# Patient Record
Sex: MALE | Race: WHITE | Employment: FULL TIME
[De-identification: names, ages, dates, MRNs, and addresses within clinical notes are randomized per-mention and may not be internally consistent; named-entity substitution may affect disease eponyms.]

---

## 2017-09-15 ENCOUNTER — SURGERY (OUTPATIENT)
Age: 42
End: 2017-09-15

## 2017-09-15 ENCOUNTER — HOSPITAL ENCOUNTER (OUTPATIENT)
Facility: HOSPITAL | Age: 42
Setting detail: HOSPITAL OUTPATIENT SURGERY
Discharge: HOME | End: 2017-09-15
Attending: INTERNAL MEDICINE | Admitting: INTERNAL MEDICINE
Payer: COMMERCIAL

## 2017-09-15 VITALS
RESPIRATION RATE: 18 BRPM | WEIGHT: 160 LBS | DIASTOLIC BLOOD PRESSURE: 79 MMHG | BODY MASS INDEX: 24.25 KG/M2 | HEART RATE: 58 BPM | HEIGHT: 68 IN | OXYGEN SATURATION: 98 % | SYSTOLIC BLOOD PRESSURE: 115 MMHG

## 2017-09-15 DIAGNOSIS — R10.12 LEFT UPPER QUADRANT PAIN: ICD-10-CM

## 2017-09-15 PROCEDURE — 0DB48ZX EXCISION OF ESOPHAGOGASTRIC JUNCTION, VIA NATURAL OR ARTIFICIAL OPENING ENDOSCOPIC, DIAGNOSTIC: ICD-10-PCS | Performed by: INTERNAL MEDICINE

## 2017-09-15 PROCEDURE — 88312 SPECIAL STAINS GROUP 1: CPT | Performed by: INTERNAL MEDICINE

## 2017-09-15 PROCEDURE — 0DB68ZX EXCISION OF STOMACH, VIA NATURAL OR ARTIFICIAL OPENING ENDOSCOPIC, DIAGNOSTIC: ICD-10-PCS | Performed by: INTERNAL MEDICINE

## 2017-09-15 PROCEDURE — 99152 MOD SED SAME PHYS/QHP 5/>YRS: CPT

## 2017-09-15 PROCEDURE — 88305 TISSUE EXAM BY PATHOLOGIST: CPT | Performed by: INTERNAL MEDICINE

## 2017-09-15 RX ORDER — MIDAZOLAM HYDROCHLORIDE 1 MG/ML
INJECTION INTRAMUSCULAR; INTRAVENOUS
Status: DISCONTINUED | OUTPATIENT
Start: 2017-09-15 | End: 2017-09-15

## 2017-09-15 RX ORDER — SODIUM CHLORIDE, SODIUM LACTATE, POTASSIUM CHLORIDE, CALCIUM CHLORIDE 600; 310; 30; 20 MG/100ML; MG/100ML; MG/100ML; MG/100ML
INJECTION, SOLUTION INTRAVENOUS CONTINUOUS
Status: DISCONTINUED | OUTPATIENT
Start: 2017-09-15 | End: 2017-09-15

## 2017-09-15 RX ORDER — MIDAZOLAM HYDROCHLORIDE 1 MG/ML
1 INJECTION INTRAMUSCULAR; INTRAVENOUS EVERY 5 MIN PRN
Status: DISCONTINUED | OUTPATIENT
Start: 2017-09-15 | End: 2017-09-15

## 2017-09-15 RX ORDER — SODIUM CHLORIDE 0.9 % (FLUSH) 0.9 %
10 SYRINGE (ML) INJECTION AS NEEDED
Status: DISCONTINUED | OUTPATIENT
Start: 2017-09-15 | End: 2017-09-15

## 2017-09-15 NOTE — H&P
PRE-PROCEDURE UPDATE    HPI: Angeline Angel is a 40year old male. 5/20/1975. Patient presents for an Esophagogastroduodenoscopy.  Abdominal pain, seen 8/14/17, see office visit for details    ALLERGIES: No Known Allergies      No current outpatient pr m)   Wt 160 lb (72.6 kg)   SpO2 99%   BMI 24.33 kg/m²   LUNGS:   Clear to auscultation. CARDIAC:   RRR, normal S1, S2; no S3 or murmur appreciated. ABDOMEN:   Bowel sounds normoactive. Soft, no organomegaly or masses appreciated. Nontender.     IMPRESSION

## 2017-09-15 NOTE — PROCEDURES
ESOPHAGOGASTRODUODENOSCOPY REPORT    Patient Name:  Sarabjit Guzman Record #: [id number]  YOB: 1975  Date of Procedure: 9/15/2017    Referring physician: Corry Lfoton DO    Surgeon:  Adelina Condon MD    Pre-op diagnosis: Epig

## 2020-10-09 PROCEDURE — 88305 TISSUE EXAM BY PATHOLOGIST: CPT | Performed by: INTERNAL MEDICINE

## 2022-09-09 RX ORDER — HEPARIN SODIUM 5000 [USP'U]/ML
5000 INJECTION, SOLUTION INTRAVENOUS; SUBCUTANEOUS ONCE
Status: CANCELLED | OUTPATIENT
Start: 2022-09-09 | End: 2022-09-09

## 2022-09-09 RX ORDER — CHLORAL HYDRATE 500 MG
1000 CAPSULE ORAL DAILY
COMMUNITY

## 2022-09-09 RX ORDER — ASCORBIC ACID 500 MG
TABLET ORAL
COMMUNITY

## 2022-09-12 ENCOUNTER — LAB ENCOUNTER (OUTPATIENT)
Dept: LAB | Age: 47
End: 2022-09-12
Attending: SURGERY
Payer: COMMERCIAL

## 2022-09-12 DIAGNOSIS — Z01.812 ENCOUNTER FOR PREPROCEDURE SCREENING LABORATORY TESTING FOR COVID-19: ICD-10-CM

## 2022-09-12 DIAGNOSIS — Z20.822 ENCOUNTER FOR PREPROCEDURE SCREENING LABORATORY TESTING FOR COVID-19: ICD-10-CM

## 2022-09-13 LAB — SARS-COV-2 RNA RESP QL NAA+PROBE: NOT DETECTED

## 2022-09-14 ENCOUNTER — ANESTHESIA (OUTPATIENT)
Dept: SURGERY | Facility: HOSPITAL | Age: 47
End: 2022-09-14
Payer: COMMERCIAL

## 2022-09-14 ENCOUNTER — ANESTHESIA EVENT (OUTPATIENT)
Dept: SURGERY | Facility: HOSPITAL | Age: 47
End: 2022-09-14
Payer: COMMERCIAL

## 2022-09-14 ENCOUNTER — HOSPITAL ENCOUNTER (OUTPATIENT)
Facility: HOSPITAL | Age: 47
Setting detail: HOSPITAL OUTPATIENT SURGERY
Discharge: HOME | End: 2022-09-14
Attending: SURGERY | Admitting: SURGERY
Payer: COMMERCIAL

## 2022-09-14 VITALS
TEMPERATURE: 99 F | HEART RATE: 52 BPM | BODY MASS INDEX: 18.35 KG/M2 | DIASTOLIC BLOOD PRESSURE: 70 MMHG | RESPIRATION RATE: 16 BRPM | OXYGEN SATURATION: 100 % | WEIGHT: 123.88 LBS | SYSTOLIC BLOOD PRESSURE: 106 MMHG | HEIGHT: 69 IN

## 2022-09-14 DIAGNOSIS — Z20.822 ENCOUNTER FOR PREPROCEDURE SCREENING LABORATORY TESTING FOR COVID-19: Primary | ICD-10-CM

## 2022-09-14 DIAGNOSIS — Z01.812 ENCOUNTER FOR PREPROCEDURE SCREENING LABORATORY TESTING FOR COVID-19: Primary | ICD-10-CM

## 2022-09-14 PROCEDURE — 8E0W4CZ ROBOTIC ASSISTED PROCEDURE OF TRUNK REGION, PERCUTANEOUS ENDOSCOPIC APPROACH: ICD-10-PCS | Performed by: SURGERY

## 2022-09-14 PROCEDURE — 0YQ54ZZ REPAIR RIGHT INGUINAL REGION, PERCUTANEOUS ENDOSCOPIC APPROACH: ICD-10-PCS | Performed by: SURGERY

## 2022-09-14 DEVICE — LAPAROSCOPIC SELF-FIXATING MESH POLYESTER WITH POLYLACTIC ACID GRIPS AND COLLAGEN FILM
Type: IMPLANTABLE DEVICE | Site: INGUINAL | Status: FUNCTIONAL
Brand: PROGRIP

## 2022-09-14 RX ORDER — HYDROCODONE BITARTRATE AND ACETAMINOPHEN 10; 325 MG/1; MG/1
2 TABLET ORAL ONCE AS NEEDED
Status: DISCONTINUED | OUTPATIENT
Start: 2022-09-14 | End: 2022-09-14

## 2022-09-14 RX ORDER — PROCHLORPERAZINE EDISYLATE 5 MG/ML
5 INJECTION INTRAMUSCULAR; INTRAVENOUS EVERY 8 HOURS PRN
Status: DISCONTINUED | OUTPATIENT
Start: 2022-09-14 | End: 2022-09-14

## 2022-09-14 RX ORDER — ONDANSETRON 2 MG/ML
4 INJECTION INTRAMUSCULAR; INTRAVENOUS EVERY 6 HOURS PRN
Status: DISCONTINUED | OUTPATIENT
Start: 2022-09-14 | End: 2022-09-14

## 2022-09-14 RX ORDER — ONDANSETRON 2 MG/ML
INJECTION INTRAMUSCULAR; INTRAVENOUS AS NEEDED
Status: DISCONTINUED | OUTPATIENT
Start: 2022-09-14 | End: 2022-09-14 | Stop reason: SURG

## 2022-09-14 RX ORDER — BUPIVACAINE HYDROCHLORIDE 5 MG/ML
INJECTION, SOLUTION EPIDURAL; INTRACAUDAL AS NEEDED
Status: DISCONTINUED | OUTPATIENT
Start: 2022-09-14 | End: 2022-09-14 | Stop reason: HOSPADM

## 2022-09-14 RX ORDER — DEXAMETHASONE SODIUM PHOSPHATE 4 MG/ML
VIAL (ML) INJECTION AS NEEDED
Status: DISCONTINUED | OUTPATIENT
Start: 2022-09-14 | End: 2022-09-14 | Stop reason: SURG

## 2022-09-14 RX ORDER — HYDROCODONE BITARTRATE AND ACETAMINOPHEN 10; 325 MG/1; MG/1
1 TABLET ORAL ONCE AS NEEDED
Status: DISCONTINUED | OUTPATIENT
Start: 2022-09-14 | End: 2022-09-14

## 2022-09-14 RX ORDER — SODIUM CHLORIDE, SODIUM LACTATE, POTASSIUM CHLORIDE, CALCIUM CHLORIDE 600; 310; 30; 20 MG/100ML; MG/100ML; MG/100ML; MG/100ML
INJECTION, SOLUTION INTRAVENOUS CONTINUOUS
Status: DISCONTINUED | OUTPATIENT
Start: 2022-09-14 | End: 2022-09-14

## 2022-09-14 RX ORDER — HYDROMORPHONE HYDROCHLORIDE 1 MG/ML
0.6 INJECTION, SOLUTION INTRAMUSCULAR; INTRAVENOUS; SUBCUTANEOUS EVERY 5 MIN PRN
Status: DISCONTINUED | OUTPATIENT
Start: 2022-09-14 | End: 2022-09-14

## 2022-09-14 RX ORDER — LIDOCAINE HYDROCHLORIDE 10 MG/ML
INJECTION, SOLUTION EPIDURAL; INFILTRATION; INTRACAUDAL; PERINEURAL AS NEEDED
Status: DISCONTINUED | OUTPATIENT
Start: 2022-09-14 | End: 2022-09-14 | Stop reason: SURG

## 2022-09-14 RX ORDER — HYDROMORPHONE HYDROCHLORIDE 1 MG/ML
0.2 INJECTION, SOLUTION INTRAMUSCULAR; INTRAVENOUS; SUBCUTANEOUS EVERY 5 MIN PRN
Status: DISCONTINUED | OUTPATIENT
Start: 2022-09-14 | End: 2022-09-14

## 2022-09-14 RX ORDER — HYDROCODONE BITARTRATE AND ACETAMINOPHEN 5; 325 MG/1; MG/1
1 TABLET ORAL EVERY 4 HOURS PRN
Qty: 20 TABLET | Refills: 0 | Status: SHIPPED | OUTPATIENT
Start: 2022-09-14

## 2022-09-14 RX ORDER — ACETAMINOPHEN 500 MG
1000 TABLET ORAL ONCE AS NEEDED
Status: DISCONTINUED | OUTPATIENT
Start: 2022-09-14 | End: 2022-09-14

## 2022-09-14 RX ORDER — SCOLOPAMINE TRANSDERMAL SYSTEM 1 MG/1
PATCH, EXTENDED RELEASE TRANSDERMAL AS NEEDED
Status: DISCONTINUED | OUTPATIENT
Start: 2022-09-14 | End: 2022-09-14 | Stop reason: SURG

## 2022-09-14 RX ORDER — KETOROLAC TROMETHAMINE 30 MG/ML
INJECTION, SOLUTION INTRAMUSCULAR; INTRAVENOUS AS NEEDED
Status: DISCONTINUED | OUTPATIENT
Start: 2022-09-14 | End: 2022-09-14 | Stop reason: SURG

## 2022-09-14 RX ORDER — SCOLOPAMINE TRANSDERMAL SYSTEM 1 MG/1
1 PATCH, EXTENDED RELEASE TRANSDERMAL ONCE
Status: DISCONTINUED | OUTPATIENT
Start: 2022-09-14 | End: 2022-09-14 | Stop reason: HOSPADM

## 2022-09-14 RX ORDER — LIDOCAINE HYDROCHLORIDE AND EPINEPHRINE 10; 10 MG/ML; UG/ML
INJECTION, SOLUTION INFILTRATION; PERINEURAL AS NEEDED
Status: DISCONTINUED | OUTPATIENT
Start: 2022-09-14 | End: 2022-09-14 | Stop reason: HOSPADM

## 2022-09-14 RX ORDER — CEFAZOLIN SODIUM/WATER 2 G/20 ML
SYRINGE (ML) INTRAVENOUS
Status: DISCONTINUED
Start: 2022-09-14 | End: 2022-09-14

## 2022-09-14 RX ORDER — ACETAMINOPHEN 500 MG
1000 TABLET ORAL ONCE
Status: DISCONTINUED | OUTPATIENT
Start: 2022-09-14 | End: 2022-09-14 | Stop reason: HOSPADM

## 2022-09-14 RX ORDER — HYDROMORPHONE HYDROCHLORIDE 1 MG/ML
0.4 INJECTION, SOLUTION INTRAMUSCULAR; INTRAVENOUS; SUBCUTANEOUS EVERY 5 MIN PRN
Status: DISCONTINUED | OUTPATIENT
Start: 2022-09-14 | End: 2022-09-14

## 2022-09-14 RX ORDER — MIDAZOLAM HYDROCHLORIDE 1 MG/ML
INJECTION INTRAMUSCULAR; INTRAVENOUS AS NEEDED
Status: DISCONTINUED | OUTPATIENT
Start: 2022-09-14 | End: 2022-09-14 | Stop reason: SURG

## 2022-09-14 RX ORDER — NALOXONE HYDROCHLORIDE 0.4 MG/ML
80 INJECTION, SOLUTION INTRAMUSCULAR; INTRAVENOUS; SUBCUTANEOUS AS NEEDED
Status: DISCONTINUED | OUTPATIENT
Start: 2022-09-14 | End: 2022-09-14

## 2022-09-14 RX ORDER — ROCURONIUM BROMIDE 10 MG/ML
INJECTION, SOLUTION INTRAVENOUS AS NEEDED
Status: DISCONTINUED | OUTPATIENT
Start: 2022-09-14 | End: 2022-09-14 | Stop reason: SURG

## 2022-09-14 RX ORDER — CEFAZOLIN SODIUM/WATER 2 G/20 ML
2 SYRINGE (ML) INTRAVENOUS ONCE
Status: COMPLETED | OUTPATIENT
Start: 2022-09-14 | End: 2022-09-14

## 2022-09-14 RX ORDER — MEPERIDINE HYDROCHLORIDE 25 MG/ML
INJECTION INTRAMUSCULAR; INTRAVENOUS; SUBCUTANEOUS
Status: COMPLETED
Start: 2022-09-14 | End: 2022-09-14

## 2022-09-14 RX ORDER — MEPERIDINE HYDROCHLORIDE 25 MG/ML
12.5 INJECTION INTRAMUSCULAR; INTRAVENOUS; SUBCUTANEOUS ONCE
Status: COMPLETED | OUTPATIENT
Start: 2022-09-14 | End: 2022-09-14

## 2022-09-14 RX ADMIN — KETOROLAC TROMETHAMINE 30 MG: 30 INJECTION, SOLUTION INTRAMUSCULAR; INTRAVENOUS at 15:22:00

## 2022-09-14 RX ADMIN — CEFAZOLIN SODIUM/WATER 2 G: 2 G/20 ML SYRINGE (ML) INTRAVENOUS at 14:41:00

## 2022-09-14 RX ADMIN — DEXAMETHASONE SODIUM PHOSPHATE 4 MG: 4 MG/ML VIAL (ML) INJECTION at 14:30:00

## 2022-09-14 RX ADMIN — ROCURONIUM BROMIDE 20 MG: 10 INJECTION, SOLUTION INTRAVENOUS at 14:30:00

## 2022-09-14 RX ADMIN — SODIUM CHLORIDE, SODIUM LACTATE, POTASSIUM CHLORIDE, CALCIUM CHLORIDE: 600; 310; 30; 20 INJECTION, SOLUTION INTRAVENOUS at 15:38:00

## 2022-09-14 RX ADMIN — SCOLOPAMINE TRANSDERMAL SYSTEM 1 PATCH: 1 PATCH, EXTENDED RELEASE TRANSDERMAL at 14:19:00

## 2022-09-14 RX ADMIN — ROCURONIUM BROMIDE 15 MG: 10 INJECTION, SOLUTION INTRAVENOUS at 14:37:00

## 2022-09-14 RX ADMIN — MIDAZOLAM HYDROCHLORIDE 2 MG: 1 INJECTION INTRAMUSCULAR; INTRAVENOUS at 14:26:00

## 2022-09-14 RX ADMIN — ROCURONIUM BROMIDE 5 MG: 10 INJECTION, SOLUTION INTRAVENOUS at 15:10:00

## 2022-09-14 RX ADMIN — ONDANSETRON 4 MG: 2 INJECTION INTRAMUSCULAR; INTRAVENOUS at 15:22:00

## 2022-09-14 RX ADMIN — LIDOCAINE HYDROCHLORIDE 90 MG: 10 INJECTION, SOLUTION EPIDURAL; INFILTRATION; INTRACAUDAL; PERINEURAL at 14:29:00

## 2022-09-14 NOTE — BRIEF OP NOTE
Pre-Operative Diagnosis: RIGHT INGUINAL HERNIA     Post-Operative Diagnosis: RIGHT INGUINAL HERNIA      Procedure Performed:   ROBOTIC ASSISTED REPAIR OF RIGHT INGUINAL HERNIA WITH MESH    Surgeon(s) and Role:     * Maribell Rowland MD - Primary    Assistant(s):  PA: Luann Jaimes          Estimated Blood Loss: Blood Output: 5 mL (9/14/2022  3:20 PM)        Sandee House MD  9/14/2022  3:30 PM

## 2022-09-14 NOTE — ANESTHESIA PROCEDURE NOTES
Airway  Date/Time: 9/14/2022 2:39 PM  Urgency: elective    Airway not difficult    General Information and Staff    Patient location during procedure: OR  Anesthesiologist: Gavin Begum MD  Performed: anesthesiologist     Indications and Patient Condition  Indications for airway management: anesthesia  Sedation level: deep  Preoxygenated: yes  Patient position: sniffing  Mask difficulty assessment: 1 - vent by mask    Final Airway Details  Final airway type: endotracheal airway      Successful airway: ETT  Cuffed: yes   Successful intubation technique: Video laryngoscopy  Facilitating devices/methods: intubating stylet  Endotracheal tube insertion site: oral  Blade: GlideScope  Blade size: #3  ETT size (mm): 6.5    Cormack-Lehane Classification: grade I - full view of glottis  Placement verified by: chest auscultation and capnometry   Measured from: lips  ETT to lips (cm): 22  Number of attempts at approach: 2    Additional Comments  Initial attempt with Glidescope - grade 1 view but 7.0 OETT tight so removed endotracheal tube, mask ventilated again, and second attempt with Glidescope but used 6.5 OETT that passed through easily and atraumatically

## 2022-09-14 NOTE — H&P
[address] of this note is different from the original.  Rich Moeller is a 56year old  male    Patient presents for complaints of pain and a bulge in the right inguinal region. This has gradually gotten bigger    Past Medical History:   Diagnosis Date   Allergic rhinitis   Ankylosing spondylitis ([facility] 4/3/2012   Cervical spondylosis 5/21/2001   early   Chronic rhinitis 6/18/2002   Dizziness   GERD (gastroesophageal reflux disease) 6/19/2009   \"(+) GERD stay on Prilosec\"   HLA B27 (HLA B27 positive)   Immunocompromised patient ([facility] 1/5/2006   Neck arthritis 6/29/2000   Nonspecific reaction to cell mediated immunity measurement of gamma interferon antigen response without active tuberculosis 5/8/2012   Reaction to QuantiFERON-TB test   Positive TB test 5/24/2012   Vasomotor rhinitis     Past Surgical History:   Procedure Laterality Date   OTHER SURGICAL HISTORY   dental procedures   OTHER SURGICAL HISTORY   egd   VASECTOMY     Family History   Problem Relation Age of Onset   Hypertension Father   Other (Other) Father   HLA b27   Heart Disease Paternal Grandfather   CAD   Hypertension Maternal Grandmother   High Cholesterol Maternal Grandmother   Cancer Maternal Grandfather   bone ca     Social History  Tobacco Use  Smoking status: Never  Smokeless tobacco: Never  Alcohol use:  Yes  Alcohol/week: 0.0 standard drinks  Comment: occ  Drug use: No    ROS:  GENERAL HEALTH: otherwise feels well, no weight loss, no fever or chills  SKIN: denies any unusual skin rashes or jaundice  HEENT: denies nasal congestion, sinus pain or sore throat; hearing loss negative  RESPIRATORY: denies shortness of breath, wheezing or cough   CARDIOVASCULAR: denies chest pain or ALLEN; no palpitations   GI: denies nausea, vomiting, constipation, diarrhea; no rectal bleeding; no heartburn  GENITAL/: no dysuria, urgency or frequency, no tea colored urine  MUSCULOSKELETAL: no joint complaints upper or lower extremities  HEMATOLOGY: denies hx anemia; denies bruising or excessive bleeding    EXAM:  GENERAL: well developed, well nourished male, in no apparent distress  SKIN: anicteric  EYES: PERRLA, EOMI, sclera anicteric  HEENT: normocephalic; normal nose  NECK: supple, no JVD  RESPIRATORY: clear to percussion and auscultation  CARDIOVASCULAR: RRR, murmur negative  ABDOMEN: normal active BS, soft, nondistended; no HSM, no masses. There is a reducible hernia present in the right inguinal region. The testes are descended. LYMPHATIC: no lymphadenopathy  EXTREMITIES: no cyanosis, clubbing or edema    Imp: right inguinal hernia    Rec: Robotic assisted right inguinal hernia repair with mesh    The risks, benefits, and alternatives were discussed with the patient at length. We discussed the expectations after surgery including activity restrictions, weight limitations.  All of the patients questions were answered    The patient was given the booklet about hernia surgery

## 2022-09-15 NOTE — OPERATIVE REPORT
[facility]    PATIENT'S NAME: Chikis Joshua   ATTENDING PHYSICIAN: Chris Musa M.D. OPERATING PHYSICIAN: Chris Musa M.D. PATIENT ACCOUNT#:   [de-identified]    LOCATION:  [address] [place] [address]  MEDICAL RECORD #:   [id number]       YOB: 1975  ADMISSION DATE:       09/14/2022      OPERATION DATE:  09/14/2022    OPERATIVE REPORT    PREOPERATIVE DIAGNOSIS:  Right inguinal hernia. POSTOPERATIVE DIAGNOSIS:  Right inguinal hernia. PROCEDURE:  Robotic-assisted repair of right inguinal hernia with mesh. ASSISTANT:  Zeb Verdugo PA-C. Her assistance was necessary for prepping, draping, camera holding, trocar inserting, passing suture and mesh throughout the procedure. ANESTHESIA:  General.    OPERATIVE TECHNIQUE:  Patient was brought into the operating room, placed on the operating table in supine position. Inhalation anesthesia provided by the attending anesthesiologist.  The abdomen was prepped in usual sterile fashion. Then, 1% lidocaine and 0.5%  Marcaine was used as a local anesthetic. I made a skin incision in the midline just above the umbilicus. I inserted a Veress needle, established a pneumoperitoneum, placed an 8 mm trocar. I then inserted the camera. I placed an 8 mm trocar on the right. My assistant, she placed an 8 mm trocar in the left. Both were done under direct laparoscopic guidance. We docked the robot, inserted our instruments, and I went to the robotic console. First, I inspected the pelvic floor. Patient had a moderate-sized indirect right inguinal hernia. I divided the overlying peritoneum. I completely reduced the indirect hernia sac and exposed the entire inguinal floor. When this was complete, my assistant passed me the ProGrip mesh. I placed the ProGrip mesh across the inguinal floor centered over the hernia defect. I pressed the mesh into position. Hemostasis was good.   She then passed me the V-Loc suture and I reapproximated the peritoneum and she then retrieved the needle. At this time, the robotic portion of the procedure was complete. The trocars were removed. The pneumoperitoneum released. The wounds were closed with absorbable sutures. Steri-Strips, sterile dressing were provided. Patient tolerated the procedure well. He was taken to recovery room for observation.     Dictated By Mckayla Stringer M.D.  d: 09/14/2022 15:34:00  t: 09/14/2022 20:49:58  [facility] [id number]  CJT/

## 2023-11-07 ENCOUNTER — APPOINTMENT (OUTPATIENT)
Dept: URBAN - METROPOLITAN AREA CLINIC 242 | Age: 48
Setting detail: DERMATOLOGY
End: 2023-11-07

## 2023-11-07 DIAGNOSIS — L72.8 OTHER FOLLICULAR CYSTS OF THE SKIN AND SUBCUTANEOUS TISSUE: ICD-10-CM

## 2023-11-07 PROCEDURE — OTHER MONITORING: OTHER

## 2023-11-07 PROCEDURE — OTHER COUNSELING: OTHER

## 2023-11-07 PROCEDURE — 99203 OFFICE O/P NEW LOW 30 MIN: CPT

## 2023-11-07 PROCEDURE — OTHER DIAGNOSIS COMMENT: OTHER

## 2023-11-07 ASSESSMENT — LOCATION DETAILED DESCRIPTION DERM: LOCATION DETAILED: RIGHT POSTERIOR PARIETAL SCALP

## 2023-11-07 ASSESSMENT — LOCATION ZONE DERM: LOCATION ZONE: SCALP

## 2023-11-07 ASSESSMENT — LOCATION SIMPLE DESCRIPTION DERM: LOCATION SIMPLE: POSTERIOR SCALP

## 2024-12-17 ENCOUNTER — OFFICE VISIT (OUTPATIENT)
Dept: SURGERY | Facility: CLINIC | Age: 49
End: 2024-12-17

## 2024-12-17 DIAGNOSIS — R10.2 PELVIC PAIN: Primary | ICD-10-CM

## 2024-12-17 DIAGNOSIS — R39.198 SLOW URINARY STREAM: ICD-10-CM

## 2024-12-17 DIAGNOSIS — Z12.5 SCREENING FOR PROSTATE CANCER: ICD-10-CM

## 2024-12-17 DIAGNOSIS — R31.29 MICROSCOPIC HEMATURIA: ICD-10-CM

## 2024-12-17 LAB
APPEARANCE: CLEAR
BILIRUBIN: NEGATIVE
GLUCOSE (URINE DIPSTICK): NEGATIVE MG/DL
KETONES (URINE DIPSTICK): NEGATIVE MG/DL
LEUKOCYTES: NEGATIVE
MULTISTIX LOT#: NORMAL NUMERIC
NITRITE, URINE: NEGATIVE
OCCULT BLOOD: NEGATIVE
PH, URINE: 6 (ref 4.5–8)
PROTEIN (URINE DIPSTICK): NEGATIVE MG/DL
SPECIFIC GRAVITY: <=1.005 (ref 1–1.03)
URINE-COLOR: YELLOW
UROBILINOGEN,SEMI-QN: 0.2 MG/DL (ref 0–1.9)

## 2024-12-17 PROCEDURE — 99204 OFFICE O/P NEW MOD 45 MIN: CPT | Performed by: PHYSICIAN ASSISTANT

## 2024-12-17 PROCEDURE — 81003 URINALYSIS AUTO W/O SCOPE: CPT | Performed by: PHYSICIAN ASSISTANT

## 2024-12-17 PROCEDURE — 51798 US URINE CAPACITY MEASURE: CPT | Performed by: PHYSICIAN ASSISTANT

## 2024-12-17 NOTE — PROGRESS NOTES
[facility]    Urology Consult Note    History of Present Illness:   Patient is a 49 year old male with hx of ankylosing spondylitis, GERD, who presents today for consultation from Dr. Prince's office for pelvic pain.    Patient was seen previously by Dr. Jin Pressley at [facility] for pelvic pain in 2021. A that time had occasional urgency, but no dysuria or gross hematuria. UA was negative, PVR 19mL. PSA 0.523 and ORIANA 35g without nodule or induration. He was referred to PFT but did not complete.    He is here today for same, intermittent pelvic pain. Urine stream is slower at night, but otherwise no other voiding complaints. Notices worse pain if he postpones voiding.    Drinks 1 cup of coffee daily - had been more previously. 1-2 glasses of water.  Bowels regular with fiber/supplements    Unable to provide urine sample today.  Last UA 8/2/23 +3-10RBC/hpf and calcium oxalate crystals.    No hx of stones or UTI  No FH of  cancers      HISTORY:  Past Medical History:    Allergic rhinitis    Ankylosing spondylitis (HCC)    Cervical spondylosis    early    Chronic rhinitis    Dizziness    GERD (gastroesophageal reflux disease)    \"(+) GERD stay on Prilosec\"    HLA B27 (HLA B27 positive)    Hx of motion sickness    Immunocompromised patient (HCC)    Neck arthritis    Nonspecific reaction to cell mediated immunity measurement of gamma interferon antigen response without active tuberculosis    Reaction to QuantiFERON-TB test    Positive TB test    Vasomotor rhinitis    Visual impairment    GLASSES      Past Surgical History:   Procedure Laterality Date    Other surgical history      dental procedures    Other surgical history      egd    Upper gi endoscopy,exam      Vasectomy        Family History   Problem Relation Age of Onset    Hypertension Father     Other (Other) Father         HLA b27    Heart Disease Paternal Grandfather         CAD    Hypertension Maternal Grandmother      High Cholesterol Maternal Grandmother     Cancer Maternal Grandfather         bone ca      Social History:   Social History     Socioeconomic History    Marital status:    Tobacco Use    Smoking status: Never    Smokeless tobacco: Never   Vaping Use    Vaping status: Never Used   Substance and Sexual Activity    Alcohol use: Yes     Alcohol/week: 0.0 standard drinks of alcohol     Comment: occ    Drug use: No   Other Topics Concern    Caffeine Concern Yes     Comment: 1-2 CUPS OF COFFEE DAILY    Exercise No     Comment: hx: 3-4x/weekly, running/jogging    Seat Belt Yes        Allergies  Allergies[1]    Review of Systems:   A 10-point review of systems was completed and is negative other than as noted above.    Physical Exam:   There were no vitals taken for this visit.    GENERAL APPEARANCE: well developed, well nourished, in no acute distress  NEUROLOGIC: no localizing neurologic signs, alert and oriented x 3, converses appropriately  HEAD: atraumatic, normocephalic  EYES: sclera non-icteric  ORAL CAVITY: mucosa moist  NECK/THYROID: no obvious masses or goiter  LUNGS: non-labored breathing  EXTREMITIES: warm, well-perfused. No clubbing, cyanosis or edema.  SKIN: no obvious rashes    Results:     Laboratory Data:  Lab Results   Component Value Date    WBC 7.88 07/30/2021    HGB 13.7 07/30/2021     07/30/2021     Lab Results   Component Value Date     02/27/2020    K 4.00 02/27/2020     02/27/2020    CO2 31.1 02/27/2020    BUN 20.0 02/27/2020    GLU 90 02/27/2020    AST 18 07/30/2021    ALT 19 07/30/2021    TP 6.8 07/30/2021    ALB 4.3 07/30/2021    CA 8.9 02/27/2020       Urinalysis Results (last three years):  No results for input(s)  in the last 3 years    Urine Culture Results (last three years):  No results found for: \"URINECUL\"    Imaging  No results found.      Impression:     Patient is a 49 year old male with hx of ankylosing spondylitis, GERD, who presents today for consultation  from Dr. Prince's office for pelvic pain.    Reviewed with patient potential etiologies of microscopic hematuria including but not limited to malignancy. Reviewed that with his pelvic pain symptoms would recommend proceeding with CT and cystoscopy.     Although suspect his pelvic pain is related to pelvic floor dysfunction and PFT will likely be beneficial, would complete work up for hematuria. Pelvic floor stretches and relaxation techniques for at home provided.     For his mild urinary symptoms we could consider trial of tamsulosin, at this time will defer until evaluation complete.    Recommendations:  As above, cystoscopy and CTU.    Thank you very much for this consult. Please call if there are any questions or concerns.     Maria T Villalobos PA-C  Urology  [facility]    Date: 12/17/2024          [1] No Known Allergies

## 2024-12-31 ENCOUNTER — HOSPITAL ENCOUNTER (OUTPATIENT)
Dept: CT IMAGING | Age: 49
Discharge: HOME | End: 2024-12-31
Attending: PHYSICIAN ASSISTANT
Payer: COMMERCIAL

## 2024-12-31 DIAGNOSIS — R10.2 PELVIC PAIN: ICD-10-CM

## 2024-12-31 DIAGNOSIS — R31.29 MICROSCOPIC HEMATURIA: ICD-10-CM

## 2024-12-31 PROCEDURE — 76377 3D RENDER W/INTRP POSTPROCES: CPT | Performed by: PHYSICIAN ASSISTANT

## 2024-12-31 PROCEDURE — 74178 CT ABD&PLV WO CNTR FLWD CNTR: CPT | Performed by: PHYSICIAN ASSISTANT

## 2025-02-01 LAB
GAMMA INTERFERON BACKGROUND BLD IA-ACNC: 0.01 IU/ML
M TB IFN-G BLD-IMP: NEGATIVE
M TB IFN-G CD4+ BCKGRND COR BLD-ACNC: 0 IU/ML
M TB IFN-G CD4+CD8+ BCKGRND COR BLD-ACNC: 0 IU/ML
MITOGEN IGNF BCKGRD COR BLD-ACNC: 7.95 IU/ML

## 2025-03-04 ENCOUNTER — LAB ENCOUNTER (OUTPATIENT)
Dept: LAB | Age: 50
End: 2025-03-04
Attending: PHYSICIAN ASSISTANT
Payer: COMMERCIAL

## 2025-03-04 DIAGNOSIS — R10.2 PELVIC PAIN: ICD-10-CM

## 2025-03-04 DIAGNOSIS — Z12.5 SCREENING FOR PROSTATE CANCER: ICD-10-CM

## 2025-03-04 LAB — COMPLEXED PSA SERPL-MCNC: 0.42 NG/ML (ref ?–4)

## 2025-03-04 PROCEDURE — 36415 COLL VENOUS BLD VENIPUNCTURE: CPT

## 2025-03-11 ENCOUNTER — TELEPHONE (OUTPATIENT)
Dept: SURGERY | Facility: CLINIC | Age: 50
End: 2025-03-11

## 2025-03-11 NOTE — TELEPHONE ENCOUNTER
This encounter is now closed.     RN called patient and offered appt on 5/6 Tuesday at 10 AM for cysto with Dr Shaw   No answer.   Reached out via [employer]    PSA is normal and should be rechecked at age 51. It is recommended that you follow up for cystoscopy as discussed. Please let us know if you have any questions.     Maria T FARRELL   Written by Maria T Khan PA-C on 3/5/2025 11:42 AM CST  Seen by patient Arjun Wright on 3/5/2025 12:28 PM

## (undated) DEVICE — FORCEP RADIAL JAW 4

## (undated) DEVICE — MEGA SUTURECUT ND: Brand: ENDOWRIST

## (undated) DEVICE — 40580 - THE PINK PAD - ADVANCED TRENDELENBURG POSITIONING KIT: Brand: 40580 - THE PINK PAD - ADVANCED TRENDELENBURG POSITIONING KIT

## (undated) DEVICE — MONOPOLAR CURVED SCISSORS: Brand: ENDOWRIST

## (undated) DEVICE — SPONGE STICK WITH PVP-I: Brand: KENDALL

## (undated) DEVICE — ENDOPATH ULTRA VERESS INSUFFLATION NEEDLES WITH LUER LOCK CONNECTORS: Brand: ENDOPATH

## (undated) DEVICE — VISUALIZATION SYSTEM: Brand: CLEARIFY

## (undated) DEVICE — 450 ML BOTTLE OF 0.05% CHLORHEXIDINE GLUCONATE IN 99.95% STERILE WATER FOR IRRIGATION, USP AND APPLICATOR.: Brand: IRRISEPT ANTIMICROBIAL WOUND LAVAGE

## (undated) DEVICE — Device: Brand: DEFENDO AIR/WATER/SUCTION AND BIOPSY VALVE

## (undated) DEVICE — BLADELESS OBTURATOR: Brand: WECK VISTA

## (undated) DEVICE — ENDOSCOPY PACK UPPER: Brand: MEDLINE INDUSTRIES, INC.

## (undated) DEVICE — COVER WAND RF DETECT

## (undated) DEVICE — COLUMN DRAPE

## (undated) DEVICE — 3M(TM) TEGADERM(TM) TRANSPARENT FILM DRESSING FRAME STYLE 9505W: Brand: 3M™ TEGADERM™

## (undated) DEVICE — ARM DRAPE

## (undated) DEVICE — DRAPE,TOP,102X53,STERILE: Brand: MEDLINE

## (undated) DEVICE — STERILE SYNTHETIC POLYISOPRENE POWDER-FREE SURGICAL GLOVES WITH HYDROGEL COATING, SMOOTH FINISH, STRAIGHT FINGER: Brand: PROTEXIS

## (undated) DEVICE — CANNULA SEAL

## (undated) DEVICE — ROBOTIC GENERAL: Brand: MEDLINE INDUSTRIES, INC.

## (undated) DEVICE — SUTURE VLOC 90 2-0 9" 2145

## (undated) DEVICE — FENESTRATED BIPOLAR FORCEPS: Brand: ENDOWRIST

## (undated) DEVICE — TIP COVER ACCESSORY